# Patient Record
Sex: MALE | Race: WHITE | URBAN - METROPOLITAN AREA
[De-identification: names, ages, dates, MRNs, and addresses within clinical notes are randomized per-mention and may not be internally consistent; named-entity substitution may affect disease eponyms.]

---

## 2023-01-09 ENCOUNTER — TELEPHONE (OUTPATIENT)
Dept: HEMATOLOGY ONCOLOGY | Facility: CLINIC | Age: 78
End: 2023-01-09

## 2023-01-09 NOTE — TELEPHONE ENCOUNTER
Returned telephone called spoke with Wife  Family is wanting medical records for a research study that they have found,  explained we do not have records that go back that far  Gave her the [facility] [address] phone number

## 2023-01-09 NOTE — TELEPHONE ENCOUNTER
CALL RETURN FORM   Reason for patient call? 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 Dr Aj Ochoa   Name of person the patient was calling for? Dr jA Ochoa   Any additional information to add, if applicable? Patient was seen in 2009   Informed patient that the message will be forwarded to the team and someone will get back to them as soon as possible    Did you relay this information to the patient?  Yes